# Patient Record
Sex: FEMALE | Race: WHITE | NOT HISPANIC OR LATINO | URBAN - METROPOLITAN AREA
[De-identification: names, ages, dates, MRNs, and addresses within clinical notes are randomized per-mention and may not be internally consistent; named-entity substitution may affect disease eponyms.]

---

## 2017-11-01 ENCOUNTER — OUTPATIENT (OUTPATIENT)
Dept: OUTPATIENT SERVICES | Facility: HOSPITAL | Age: 42
LOS: 1 days | End: 2017-11-01
Payer: MEDICARE

## 2017-11-01 DIAGNOSIS — Z01.818 ENCOUNTER FOR OTHER PREPROCEDURAL EXAMINATION: ICD-10-CM

## 2017-11-01 LAB
ALBUMIN SERPL ELPH-MCNC: 3.8 G/DL — SIGNIFICANT CHANGE UP (ref 3.3–5)
ALP SERPL-CCNC: 102 U/L — SIGNIFICANT CHANGE UP (ref 40–120)
ALT FLD-CCNC: 27 U/L — SIGNIFICANT CHANGE UP (ref 10–45)
ANION GAP SERPL CALC-SCNC: 13 MMOL/L — SIGNIFICANT CHANGE UP (ref 5–17)
APTT BLD: 26.4 SEC — LOW (ref 27.5–37.4)
AST SERPL-CCNC: 27 U/L — SIGNIFICANT CHANGE UP (ref 10–40)
BILIRUB SERPL-MCNC: 0.3 MG/DL — SIGNIFICANT CHANGE UP (ref 0.2–1.2)
BLD GP AB SCN SERPL QL: NEGATIVE — SIGNIFICANT CHANGE UP
BLD GP AB SCN SERPL QL: NEGATIVE — SIGNIFICANT CHANGE UP
BUN SERPL-MCNC: 10 MG/DL — SIGNIFICANT CHANGE UP (ref 7–23)
CALCIUM SERPL-MCNC: 9.1 MG/DL — SIGNIFICANT CHANGE UP (ref 8.4–10.5)
CHLORIDE SERPL-SCNC: 100 MMOL/L — SIGNIFICANT CHANGE UP (ref 96–108)
CO2 SERPL-SCNC: 24 MMOL/L — SIGNIFICANT CHANGE UP (ref 22–31)
CREAT SERPL-MCNC: 0.72 MG/DL — SIGNIFICANT CHANGE UP (ref 0.5–1.3)
GLUCOSE SERPL-MCNC: 84 MG/DL — SIGNIFICANT CHANGE UP (ref 70–99)
HCT VFR BLD CALC: 32.9 % — LOW (ref 34.5–45)
HGB BLD-MCNC: 11.2 G/DL — LOW (ref 11.5–15.5)
INR BLD: 0.9 — SIGNIFICANT CHANGE UP (ref 0.88–1.16)
MCHC RBC-ENTMCNC: 33.1 PG — SIGNIFICANT CHANGE UP (ref 27–34)
MCHC RBC-ENTMCNC: 34 G/DL — SIGNIFICANT CHANGE UP (ref 32–36)
MCV RBC AUTO: 97.3 FL — SIGNIFICANT CHANGE UP (ref 80–100)
PLATELET # BLD AUTO: 167 K/UL — SIGNIFICANT CHANGE UP (ref 150–400)
POTASSIUM SERPL-MCNC: 4.1 MMOL/L — SIGNIFICANT CHANGE UP (ref 3.5–5.3)
POTASSIUM SERPL-SCNC: 4.1 MMOL/L — SIGNIFICANT CHANGE UP (ref 3.5–5.3)
PROT SERPL-MCNC: 6.5 G/DL — SIGNIFICANT CHANGE UP (ref 6–8.3)
PROTHROM AB SERPL-ACNC: 10 SEC — SIGNIFICANT CHANGE UP (ref 9.8–12.7)
RBC # BLD: 3.38 M/UL — LOW (ref 3.8–5.2)
RBC # FLD: 13.5 % — SIGNIFICANT CHANGE UP (ref 10.3–16.9)
RH IG SCN BLD-IMP: POSITIVE — SIGNIFICANT CHANGE UP
RH IG SCN BLD-IMP: POSITIVE — SIGNIFICANT CHANGE UP
SODIUM SERPL-SCNC: 137 MMOL/L — SIGNIFICANT CHANGE UP (ref 135–145)
WBC # BLD: 8.9 K/UL — SIGNIFICANT CHANGE UP (ref 3.8–10.5)
WBC # FLD AUTO: 8.9 K/UL — SIGNIFICANT CHANGE UP (ref 3.8–10.5)

## 2017-11-01 PROCEDURE — 86850 RBC ANTIBODY SCREEN: CPT

## 2017-11-01 PROCEDURE — 85730 THROMBOPLASTIN TIME PARTIAL: CPT

## 2017-11-01 PROCEDURE — 80053 COMPREHEN METABOLIC PANEL: CPT

## 2017-11-01 PROCEDURE — 86900 BLOOD TYPING SEROLOGIC ABO: CPT

## 2017-11-01 PROCEDURE — 85610 PROTHROMBIN TIME: CPT

## 2017-11-01 PROCEDURE — 85027 COMPLETE CBC AUTOMATED: CPT

## 2017-11-01 PROCEDURE — 86901 BLOOD TYPING SEROLOGIC RH(D): CPT

## 2017-11-07 ENCOUNTER — INPATIENT (INPATIENT)
Facility: HOSPITAL | Age: 42
LOS: 3 days | Discharge: HOME | End: 2017-11-11
Attending: OBSTETRICS & GYNECOLOGY | Admitting: OBSTETRICS & GYNECOLOGY
Payer: COMMERCIAL

## 2017-11-07 VITALS — HEIGHT: 68 IN | WEIGHT: 147.05 LBS

## 2017-11-07 LAB
GLUCOSE BLDC GLUCOMTR-MCNC: 76 MG/DL — SIGNIFICANT CHANGE UP (ref 70–99)
T PALLIDUM AB TITR SER: NEGATIVE — SIGNIFICANT CHANGE UP

## 2017-11-07 RX ORDER — NALOXONE HYDROCHLORIDE 4 MG/.1ML
0.1 SPRAY NASAL
Qty: 0 | Refills: 0 | Status: DISCONTINUED | OUTPATIENT
Start: 2017-11-07 | End: 2017-11-11

## 2017-11-07 RX ORDER — HEPARIN SODIUM 5000 [USP'U]/ML
5000 INJECTION INTRAVENOUS; SUBCUTANEOUS EVERY 12 HOURS
Qty: 0 | Refills: 0 | Status: DISCONTINUED | OUTPATIENT
Start: 2017-11-07 | End: 2017-11-11

## 2017-11-07 RX ORDER — SODIUM CHLORIDE 9 MG/ML
1000 INJECTION, SOLUTION INTRAVENOUS
Qty: 0 | Refills: 0 | Status: DISCONTINUED | OUTPATIENT
Start: 2017-11-07 | End: 2017-11-11

## 2017-11-07 RX ORDER — ACETAMINOPHEN 500 MG
650 TABLET ORAL EVERY 6 HOURS
Qty: 0 | Refills: 0 | Status: DISCONTINUED | OUTPATIENT
Start: 2017-11-07 | End: 2017-11-11

## 2017-11-07 RX ORDER — OXYTOCIN 10 UNIT/ML
41.67 VIAL (ML) INJECTION
Qty: 20 | Refills: 0 | Status: DISCONTINUED | OUTPATIENT
Start: 2017-11-07 | End: 2017-11-11

## 2017-11-07 RX ORDER — SODIUM CHLORIDE 9 MG/ML
1000 INJECTION, SOLUTION INTRAVENOUS
Qty: 0 | Refills: 0 | Status: DISCONTINUED | OUTPATIENT
Start: 2017-11-07 | End: 2017-11-07

## 2017-11-07 RX ORDER — TETANUS TOXOID, REDUCED DIPHTHERIA TOXOID AND ACELLULAR PERTUSSIS VACCINE, ADSORBED 5; 2.5; 8; 8; 2.5 [IU]/.5ML; [IU]/.5ML; UG/.5ML; UG/.5ML; UG/.5ML
0.5 SUSPENSION INTRAMUSCULAR ONCE
Qty: 0 | Refills: 0 | Status: COMPLETED | OUTPATIENT
Start: 2017-11-07 | End: 2018-10-06

## 2017-11-07 RX ORDER — SIMETHICONE 80 MG/1
80 TABLET, CHEWABLE ORAL EVERY 4 HOURS
Qty: 0 | Refills: 0 | Status: DISCONTINUED | OUTPATIENT
Start: 2017-11-07 | End: 2017-11-11

## 2017-11-07 RX ORDER — DOCUSATE SODIUM 100 MG
100 CAPSULE ORAL
Qty: 0 | Refills: 0 | Status: DISCONTINUED | OUTPATIENT
Start: 2017-11-07 | End: 2017-11-11

## 2017-11-07 RX ORDER — OXYCODONE AND ACETAMINOPHEN 5; 325 MG/1; MG/1
1 TABLET ORAL
Qty: 0 | Refills: 0 | Status: DISCONTINUED | OUTPATIENT
Start: 2017-11-07 | End: 2017-11-11

## 2017-11-07 RX ORDER — FERROUS SULFATE 325(65) MG
325 TABLET ORAL DAILY
Qty: 0 | Refills: 0 | Status: DISCONTINUED | OUTPATIENT
Start: 2017-11-07 | End: 2017-11-11

## 2017-11-07 RX ORDER — CITRIC ACID/SODIUM CITRATE 300-500 MG
30 SOLUTION, ORAL ORAL ONCE
Qty: 0 | Refills: 0 | Status: COMPLETED | OUTPATIENT
Start: 2017-11-07 | End: 2017-11-07

## 2017-11-07 RX ORDER — METOCLOPRAMIDE HCL 10 MG
10 TABLET ORAL ONCE
Qty: 0 | Refills: 0 | Status: DISCONTINUED | OUTPATIENT
Start: 2017-11-07 | End: 2017-11-07

## 2017-11-07 RX ORDER — SODIUM CHLORIDE 9 MG/ML
1000 INJECTION, SOLUTION INTRAVENOUS ONCE
Qty: 0 | Refills: 0 | Status: COMPLETED | OUTPATIENT
Start: 2017-11-07 | End: 2017-11-07

## 2017-11-07 RX ORDER — OXYTOCIN 10 UNIT/ML
333.33 VIAL (ML) INJECTION
Qty: 20 | Refills: 0 | Status: DISCONTINUED | OUTPATIENT
Start: 2017-11-07 | End: 2017-11-11

## 2017-11-07 RX ORDER — LANOLIN
1 OINTMENT (GRAM) TOPICAL
Qty: 0 | Refills: 0 | Status: DISCONTINUED | OUTPATIENT
Start: 2017-11-07 | End: 2017-11-11

## 2017-11-07 RX ORDER — DIPHENHYDRAMINE HCL 50 MG
25 CAPSULE ORAL EVERY 6 HOURS
Qty: 0 | Refills: 0 | Status: DISCONTINUED | OUTPATIENT
Start: 2017-11-07 | End: 2017-11-11

## 2017-11-07 RX ORDER — OXYTOCIN 10 UNIT/ML
41.67 VIAL (ML) INJECTION
Qty: 20 | Refills: 0 | Status: DISCONTINUED | OUTPATIENT
Start: 2017-11-07 | End: 2017-11-07

## 2017-11-07 RX ORDER — GLYCERIN ADULT
1 SUPPOSITORY, RECTAL RECTAL AT BEDTIME
Qty: 0 | Refills: 0 | Status: DISCONTINUED | OUTPATIENT
Start: 2017-11-07 | End: 2017-11-11

## 2017-11-07 RX ORDER — OXYCODONE AND ACETAMINOPHEN 5; 325 MG/1; MG/1
2 TABLET ORAL EVERY 6 HOURS
Qty: 0 | Refills: 0 | Status: DISCONTINUED | OUTPATIENT
Start: 2017-11-07 | End: 2017-11-11

## 2017-11-07 RX ORDER — IBUPROFEN 200 MG
600 TABLET ORAL EVERY 6 HOURS
Qty: 0 | Refills: 0 | Status: DISCONTINUED | OUTPATIENT
Start: 2017-11-07 | End: 2017-11-11

## 2017-11-07 RX ORDER — ONDANSETRON 8 MG/1
4 TABLET, FILM COATED ORAL EVERY 6 HOURS
Qty: 0 | Refills: 0 | Status: DISCONTINUED | OUTPATIENT
Start: 2017-11-07 | End: 2017-11-11

## 2017-11-07 RX ORDER — CEFAZOLIN SODIUM 1 G
2000 VIAL (EA) INJECTION ONCE
Qty: 0 | Refills: 0 | Status: COMPLETED | OUTPATIENT
Start: 2017-11-07 | End: 2017-11-07

## 2017-11-07 RX ADMIN — SODIUM CHLORIDE 125 MILLILITER(S): 9 INJECTION, SOLUTION INTRAVENOUS at 09:52

## 2017-11-07 RX ADMIN — Medication 325 MILLIGRAM(S): at 18:23

## 2017-11-07 RX ADMIN — HEPARIN SODIUM 5000 UNIT(S): 5000 INJECTION INTRAVENOUS; SUBCUTANEOUS at 23:26

## 2017-11-07 RX ADMIN — Medication 600 MILLIGRAM(S): at 19:19

## 2017-11-07 RX ADMIN — Medication 30 MILLILITER(S): at 10:03

## 2017-11-07 RX ADMIN — Medication 600 MILLIGRAM(S): at 18:23

## 2017-11-07 RX ADMIN — Medication 1 TABLET(S): at 18:22

## 2017-11-07 RX ADMIN — Medication 100 MILLIGRAM(S): at 10:03

## 2017-11-07 RX ADMIN — SODIUM CHLORIDE 2000 MILLILITER(S): 9 INJECTION, SOLUTION INTRAVENOUS at 08:19

## 2017-11-08 LAB
HCT VFR BLD CALC: 25.3 % — LOW (ref 34.5–45)
HGB BLD-MCNC: 8.6 G/DL — LOW (ref 11.5–15.5)
MCHC RBC-ENTMCNC: 32.6 PG — SIGNIFICANT CHANGE UP (ref 27–34)
MCHC RBC-ENTMCNC: 34 G/DL — SIGNIFICANT CHANGE UP (ref 32–36)
MCV RBC AUTO: 95.8 FL — SIGNIFICANT CHANGE UP (ref 80–100)
PLATELET # BLD AUTO: 116 K/UL — LOW (ref 150–400)
RBC # BLD: 2.64 M/UL — LOW (ref 3.8–5.2)
RBC # FLD: 13.6 % — SIGNIFICANT CHANGE UP (ref 10.3–16.9)
WBC # BLD: 12.4 K/UL — HIGH (ref 3.8–10.5)
WBC # FLD AUTO: 12.4 K/UL — HIGH (ref 3.8–10.5)

## 2017-11-08 RX ADMIN — Medication 325 MILLIGRAM(S): at 11:28

## 2017-11-08 RX ADMIN — HEPARIN SODIUM 5000 UNIT(S): 5000 INJECTION INTRAVENOUS; SUBCUTANEOUS at 23:16

## 2017-11-08 RX ADMIN — SODIUM CHLORIDE 125 MILLILITER(S): 9 INJECTION, SOLUTION INTRAVENOUS at 00:27

## 2017-11-08 RX ADMIN — Medication 600 MILLIGRAM(S): at 01:15

## 2017-11-08 RX ADMIN — Medication 600 MILLIGRAM(S): at 13:45

## 2017-11-08 RX ADMIN — OXYCODONE AND ACETAMINOPHEN 1 TABLET(S): 5; 325 TABLET ORAL at 18:43

## 2017-11-08 RX ADMIN — OXYCODONE AND ACETAMINOPHEN 1 TABLET(S): 5; 325 TABLET ORAL at 19:36

## 2017-11-08 RX ADMIN — Medication 100 MILLIGRAM(S): at 11:28

## 2017-11-08 RX ADMIN — Medication 600 MILLIGRAM(S): at 20:20

## 2017-11-08 RX ADMIN — Medication 1 TABLET(S): at 11:28

## 2017-11-08 RX ADMIN — SIMETHICONE 80 MILLIGRAM(S): 80 TABLET, CHEWABLE ORAL at 11:28

## 2017-11-08 RX ADMIN — Medication 600 MILLIGRAM(S): at 12:58

## 2017-11-08 RX ADMIN — Medication 600 MILLIGRAM(S): at 00:28

## 2017-11-08 RX ADMIN — Medication 600 MILLIGRAM(S): at 19:51

## 2017-11-08 RX ADMIN — Medication 600 MILLIGRAM(S): at 07:15

## 2017-11-08 RX ADMIN — Medication 100 MILLIGRAM(S): at 23:16

## 2017-11-08 RX ADMIN — Medication 600 MILLIGRAM(S): at 06:21

## 2017-11-08 RX ADMIN — HEPARIN SODIUM 5000 UNIT(S): 5000 INJECTION INTRAVENOUS; SUBCUTANEOUS at 11:28

## 2017-11-08 NOTE — PROGRESS NOTE ADULT - ASSESSMENT
A/P  42y yo s/p c/s, POD #1, stable and meeting postoperative milestones appropriately.   1. Pain: controlled with OPM.   2. GI: + flatus, regular diet as tolerated   3. : TOV this am   4. DVT prophylaxis: ambulation as tolerated, SubQ heparin   5. Dispo: Likely POD3 or 4

## 2017-11-08 NOTE — PROGRESS NOTE ADULT - SUBJECTIVE AND OBJECTIVE BOX
Patient evaluated at bedside.   She reports pain is well controlled.  She denies headache, dizziness, chest pain, palpitations, shortness of breathe, nausea, vomiting or heavy vaginal bleeding.  She has passed gas and is tolerating clears. Patient is breastfeeding. Overall feels well.     Physical Exam:  Vital Signs Last 24 Hrs  T(C): 36.1 (08 Nov 2017 02:00), Max: 36.4 (07 Nov 2017 13:00)  T(F): 97 (08 Nov 2017 02:00), Max: 97.5 (07 Nov 2017 13:00)  HR: 61 (08 Nov 2017 02:00) (54 - 84)  BP: 99/67 (08 Nov 2017 02:00) (98/68 - 117/67)  BP(mean): --  RR: 18 (08 Nov 2017 02:00) (18 - 18)  SpO2: 97% (08 Nov 2017 02:00) (96% - 97%)    11-07 @ 07:01  -  11-08 @ 06:46  --------------------------------------------------------  IN: 4050 mL / OUT: 1825 mL / NET: 2225 mL        GA: NAD, resting comfortably   Abd: + BS, soft, nontender, nondistended, no rebound or guarding, uterus firm at midline,  fb below umbilicus  Incision: well approximated, no erythema or discharge  : lochia WNL  Extremities: no swelling or calf tenderness

## 2017-11-09 RX ADMIN — Medication 600 MILLIGRAM(S): at 02:09

## 2017-11-09 RX ADMIN — OXYCODONE AND ACETAMINOPHEN 1 TABLET(S): 5; 325 TABLET ORAL at 22:28

## 2017-11-09 RX ADMIN — HEPARIN SODIUM 5000 UNIT(S): 5000 INJECTION INTRAVENOUS; SUBCUTANEOUS at 11:47

## 2017-11-09 RX ADMIN — OXYCODONE AND ACETAMINOPHEN 1 TABLET(S): 5; 325 TABLET ORAL at 21:29

## 2017-11-09 RX ADMIN — Medication 600 MILLIGRAM(S): at 07:41

## 2017-11-09 RX ADMIN — HEPARIN SODIUM 5000 UNIT(S): 5000 INJECTION INTRAVENOUS; SUBCUTANEOUS at 23:34

## 2017-11-09 RX ADMIN — Medication 600 MILLIGRAM(S): at 19:52

## 2017-11-09 RX ADMIN — Medication 600 MILLIGRAM(S): at 11:51

## 2017-11-09 RX ADMIN — Medication 600 MILLIGRAM(S): at 20:30

## 2017-11-09 RX ADMIN — OXYCODONE AND ACETAMINOPHEN 1 TABLET(S): 5; 325 TABLET ORAL at 17:32

## 2017-11-09 RX ADMIN — Medication 600 MILLIGRAM(S): at 06:41

## 2017-11-09 RX ADMIN — Medication 100 MILLIGRAM(S): at 12:37

## 2017-11-09 RX ADMIN — Medication 1 TABLET(S): at 12:37

## 2017-11-09 RX ADMIN — Medication 600 MILLIGRAM(S): at 01:17

## 2017-11-09 RX ADMIN — Medication 325 MILLIGRAM(S): at 12:37

## 2017-11-09 RX ADMIN — Medication 600 MILLIGRAM(S): at 12:30

## 2017-11-09 RX ADMIN — OXYCODONE AND ACETAMINOPHEN 1 TABLET(S): 5; 325 TABLET ORAL at 18:08

## 2017-11-09 NOTE — PROGRESS NOTE ADULT - SUBJECTIVE AND OBJECTIVE BOX
Patient evaluated at bedside.   She reports pain is well controlled.  She denies headache, dizziness, chest pain, palpitations, shortness of breathe, nausea, vomiting or heavy vaginal bleeding.  She has been ambulating without assistance, voiding spontaneously, passing gas, tolerating regular diet and is breastfeeding.    Physical Exam:  Vital Signs Last 24 Hrs  T(C): 36.9 (09 Nov 2017 06:21), Max: 36.9 (08 Nov 2017 18:20)  T(F): 98.5 (09 Nov 2017 06:21), Max: 98.5 (08 Nov 2017 18:20)  HR: 65 (09 Nov 2017 06:21) (65 - 71)  BP: 103/61 (09 Nov 2017 06:21) (92/58 - 103/61)  BP(mean): --  RR: 17 (09 Nov 2017 06:21) (17 - 18)  SpO2: 97% (09 Nov 2017 06:21) (97% - 99%)    11-08 @ 07:01  -  11-09 @ 07:00  --------------------------------------------------------  IN: 0 mL / OUT: 1550 mL / NET: -1550 mL        GA: NAD, resting comfortably   Abd: + BS, soft, nontender, nondistended, no rebound or guarding, uterus firm at midline,  fb below umbilicus  Incision: well approximated, no erythema or discharge  : lochia WNL  Extremities: no swelling or calf tenderness                            8.6    12.4  )-----------( 116      ( 08 Nov 2017 07:08 )             25.3

## 2017-11-09 NOTE — PROGRESS NOTE ADULT - ASSESSMENT
A/P  41yo s/p c/s, POD #2, stable and meeting postoperative milestones appropriately.   1. Pain: controlled with OPM.   2. GI: + flatus, regular diet as tolerated   3. : voiding   4. DVT prophylaxis: ambulation, SUbQ heparin   5. Dispo: Likely POD3 or 4

## 2017-11-10 RX ORDER — TETANUS TOXOID, REDUCED DIPHTHERIA TOXOID AND ACELLULAR PERTUSSIS VACCINE, ADSORBED 5; 2.5; 8; 8; 2.5 [IU]/.5ML; [IU]/.5ML; UG/.5ML; UG/.5ML; UG/.5ML
0.5 SUSPENSION INTRAMUSCULAR ONCE
Qty: 0 | Refills: 0 | Status: COMPLETED | OUTPATIENT
Start: 2017-11-10 | End: 2017-11-10

## 2017-11-10 RX ADMIN — Medication 600 MILLIGRAM(S): at 07:16

## 2017-11-10 RX ADMIN — Medication 325 MILLIGRAM(S): at 12:54

## 2017-11-10 RX ADMIN — Medication 600 MILLIGRAM(S): at 06:36

## 2017-11-10 RX ADMIN — Medication 600 MILLIGRAM(S): at 23:40

## 2017-11-10 RX ADMIN — Medication 600 MILLIGRAM(S): at 18:44

## 2017-11-10 RX ADMIN — Medication 600 MILLIGRAM(S): at 19:00

## 2017-11-10 RX ADMIN — TETANUS TOXOID, REDUCED DIPHTHERIA TOXOID AND ACELLULAR PERTUSSIS VACCINE, ADSORBED 0.5 MILLILITER(S): 5; 2.5; 8; 8; 2.5 SUSPENSION INTRAMUSCULAR at 19:16

## 2017-11-10 RX ADMIN — Medication 600 MILLIGRAM(S): at 00:34

## 2017-11-10 RX ADMIN — Medication 100 MILLIGRAM(S): at 12:53

## 2017-11-10 RX ADMIN — HEPARIN SODIUM 5000 UNIT(S): 5000 INJECTION INTRAVENOUS; SUBCUTANEOUS at 23:39

## 2017-11-10 RX ADMIN — Medication 600 MILLIGRAM(S): at 12:54

## 2017-11-10 RX ADMIN — Medication 600 MILLIGRAM(S): at 13:28

## 2017-11-10 RX ADMIN — Medication 600 MILLIGRAM(S): at 01:34

## 2017-11-10 RX ADMIN — Medication 1 TABLET(S): at 12:54

## 2017-11-10 NOTE — LACTATION INITIAL EVALUATION - OTHER DISEASES WHICH COULD AFFECT LACTATION
Hx of PP Depression with first child, per Social Work note, but pt sought out a counselor and managed it that way. She effectively  for 19 months, and desires to do the same this time, with studies showing breastfeeding balances the body's hormones and lessens the risks/severity of PP Depression. Bf'ing encouraged as long as she desires for this Dyad.

## 2017-11-10 NOTE — PROGRESS NOTE ADULT - SUBJECTIVE AND OBJECTIVE BOX
Patient evaluated at bedside.   She reports pain is well controlled.  She denies headache, dizziness, chest pain, palpitations, shortness of breathe, nausea, vomiting or heavy vaginal bleeding.  She has been ambulating without assistance, voiding spontaneously, passing gas, tolerating regular diet and is breastfeeding. Overall feels well.     Physical Exam:  Vital Signs Last 24 Hrs  T(C): 36.4 (10 Nov 2017 02:00), Max: 36.8 (09 Nov 2017 18:17)  T(F): 97.6 (10 Nov 2017 02:00), Max: 98.2 (09 Nov 2017 18:17)  HR: 68 (10 Nov 2017 02:00) (68 - 88)  BP: 110/68 (10 Nov 2017 02:00) (109/73 - 110/68)  BP(mean): --  RR: 20 (10 Nov 2017 02:00) (17 - 20)  SpO2: 98% (10 Nov 2017 02:00) (98% - 98%)      GA: NAD, resting comfortably   Abd: + BS, soft, nontender, nondistended, no rebound or guarding, uterus firm at midline,  fb below umbilicus  Incision: well approximated, no erythema or discharge  : lochia WNL  Extremities: no swelling or calf tenderness

## 2017-11-10 NOTE — DIETITIAN INITIAL EVALUATION ADULT. - OTHER INFO
41yo s/p c/s, POD #3, tolerates regular diet well > 90% po intake at meals, passing flatus, w/ no BM yet; advised on increased fiber/fluid intake (on bowel regimen), denies N/V; w/ nkfa; BF ing infant w/ no issues; breastfeeding nutrition therapy provided; skin w/ surgical incision w/ no edema.

## 2017-11-10 NOTE — LACTATION INITIAL EVALUATION - NS LACT CON REASON FOR REQ
multiparous mom/This is Mom's second baby. She reports bf'ing her first child, now x6 years old, successfully for 19 months. Denies milk supply issues. She followed baby led weaning with her first child and hopes to do the same thing with this baby; to nurse as long as baby desires and self weans themselves. Pt's desire supported. This baby found latched onto the L breast in laid back position with deep rhythmic sucks and appropriate pauses. Many audible swallows heard. Mom reports a strong pull of her nipple when baby is latched but states "my L nipple has one spot on it that is really sore". She unlatched baby to show me. Nipple came out of baby's mouth round in shape, but there is a skin colored hard spot on the lower left portion of the face of the nipple. It is not white like a bleb normally is and it is not painful to the pt to touch it, which is common with a bleb/plugged duct. It appears this spot is more like a callus or friction rub from a shallow latch. She has longer nipples and that part of the nipple could be rubbing some on baby's hard palate when latched? Reviewed good deep latching and showed ways to facilitate a deeper latch. Reviewed good skin hygiene and maintenance b/w feeds. Face of nipple is not broken or cracked; nipples are pink and "sore but it is normal nipple soreness except for that one spot on the L nipple" as pt stated. Encouraged her to seek outside LC support if deeper latching does not make that spot feel better or heal; verbalizes understanding. Baby born  @ 1039, R C/S, 39.6 wks,  now. x7 voids/x10 poops since birth (breast only), WNL. Wt loss=7.58%, up from 8.15% yesterday and was 10%. Baby has gained weight well and mature milk is coming in and breasts are filling. Encouraged as much S2S as possible and aiming to FOD of baby making sure baby feeds 8-12 times per 24 hours. Questions answered, parents reassured./c/o sore, painful nipples

## 2017-11-10 NOTE — PROGRESS NOTE ADULT - ASSESSMENT
A/P  41yo s/p c/s, POD #3, stable and meeting postoperative milestones appropriately.   1. Pain: controlled with OPM.   2. GI: + flatus, regular diet as tolerated   3. : voiding   4. DVT prophylaxis: ambulation, SUbQ heparin   5. Dispo: Likely POD3 or 4

## 2017-11-10 NOTE — LACTATION INITIAL EVALUATION - INTERVENTION OUTCOME
verbalizes understanding/demonstrates understanding of teaching/discharge criteria met/needs met/good return demonstration

## 2017-11-11 VITALS
OXYGEN SATURATION: 99 % | SYSTOLIC BLOOD PRESSURE: 112 MMHG | HEART RATE: 64 BPM | TEMPERATURE: 98 F | DIASTOLIC BLOOD PRESSURE: 70 MMHG | RESPIRATION RATE: 18 BRPM

## 2017-11-11 PROCEDURE — 88305 TISSUE EXAM BY PATHOLOGIST: CPT

## 2017-11-11 PROCEDURE — 36415 COLL VENOUS BLD VENIPUNCTURE: CPT

## 2017-11-11 PROCEDURE — 90715 TDAP VACCINE 7 YRS/> IM: CPT

## 2017-11-11 PROCEDURE — 86780 TREPONEMA PALLIDUM: CPT

## 2017-11-11 PROCEDURE — 82962 GLUCOSE BLOOD TEST: CPT

## 2017-11-11 PROCEDURE — 85027 COMPLETE CBC AUTOMATED: CPT

## 2017-11-11 RX ADMIN — Medication 100 MILLIGRAM(S): at 12:12

## 2017-11-11 RX ADMIN — Medication 600 MILLIGRAM(S): at 00:00

## 2017-11-11 RX ADMIN — Medication 600 MILLIGRAM(S): at 14:15

## 2017-11-11 RX ADMIN — Medication 650 MILLIGRAM(S): at 13:00

## 2017-11-11 RX ADMIN — Medication 325 MILLIGRAM(S): at 12:12

## 2017-11-11 RX ADMIN — Medication 600 MILLIGRAM(S): at 13:42

## 2017-11-11 RX ADMIN — Medication 650 MILLIGRAM(S): at 12:16

## 2017-11-11 RX ADMIN — Medication 600 MILLIGRAM(S): at 07:17

## 2017-11-11 RX ADMIN — Medication 600 MILLIGRAM(S): at 09:15

## 2017-11-11 RX ADMIN — Medication 1 TABLET(S): at 12:12

## 2017-11-11 NOTE — DISCHARGE NOTE OB - PATIENT PORTAL LINK FT
“You can access the [facility] Patient Portal, offered by [facility], by registering with the following website: [email or web address]”

## 2017-11-11 NOTE — DISCHARGE NOTE OB - PLAN OF CARE
return to prepregnancy state no heavy lifting, light activity, healthy diet, follow up with Dr. Madera in the next 1-2weeks

## 2017-11-11 NOTE — DISCHARGE NOTE OB - CARE PLAN
Principal Discharge DX:	Postpartum state  Goal:	return to prepregnancy state  Instructions for follow-up, activity and diet:	no heavy lifting, light activity, healthy diet, follow up with Dr. Madera in the next 1-2weeks

## 2017-11-11 NOTE — PROGRESS NOTE ADULT - ASSESSMENT
A/P   42y  s/p c/s, POD #4, stable  -  Pain: PO motrin, percocet  -  GI: regular diet  -  : voiding spontaneously   -  DVT prophylaxis: ambulation, SCDs, SQH  -  Dispo: today

## 2017-11-11 NOTE — DISCHARGE NOTE OB - CARE PROVIDER_API CALL
Michael Madera), Obstetrics and Gynecology  [address] [place] [address]  Phone: [phone]  Fax: [phone]

## 2017-11-11 NOTE — PROGRESS NOTE ADULT - SUBJECTIVE AND OBJECTIVE BOX
Patient evaluated at bedside.   She reports pain is well controlled.  She denies nausea, vomiting or heavy vaginal bleeding.  She has been ambulating without assistance, voiding spontaneously, passing gas, tolerating regular diet and is breastfeeding.    Physical Exam:  Vital Signs Last 24 Hrs  T(C): 36.6 (11 Nov 2017 02:00), Max: 36.9 (10 Nov 2017 14:29)  T(F): 97.9 (11 Nov 2017 02:00), Max: 98.4 (10 Nov 2017 14:29)  HR: 62 (11 Nov 2017 02:00) (62 - 79)  BP: 114/72 (11 Nov 2017 02:00) (110/67 - 114/72)  BP(mean): --  RR: 17 (11 Nov 2017 02:00) (17 - 18)  SpO2: 98% (11 Nov 2017 02:00) (97% - 98%)    GA: NAD, A+0 x 3  Abd: + BS, soft, nontender, nondistended, no rebound or guarding, incision clean, dry and intact, pressure dressing removed, steristrips in place, uterus firm at midline,  fb below umbilicus  : lochia WNL  Extremities: no swelling or calf tenderness

## 2017-11-13 LAB — SURGICAL PATHOLOGY STUDY: SIGNIFICANT CHANGE UP

## 2017-11-17 DIAGNOSIS — Z3A.39 39 WEEKS GESTATION OF PREGNANCY: ICD-10-CM

## 2017-11-17 DIAGNOSIS — O34.219 MATERNAL CARE FOR UNSPECIFIED TYPE SCAR FROM PREVIOUS CESAREAN DELIVERY: ICD-10-CM

## 2022-05-31 NOTE — LACTATION INITIAL EVALUATION - BREAST CANCER
Last OV:2-28-22  Last Refill:12-13-21    Next Appt:    With [address] Ann Wheeler MD)  06/06/2022 at 1:20 PM no

## 2023-05-06 NOTE — DISCHARGE NOTE OB - NS OB DC IMMUNIZATIONS2 YN
Goal Outcome Evaluation:      Plan of Care Reviewed With: patient    Overall Patient Progress: no changeOverall Patient Progress: no change    Outcome Evaluation: Pt denies pain this shift. Remains continent of bowel and bladder. Remains assist of 1 with walker in the room. No new skin issues noted. Using call light appropriately.       Yes

## 2024-04-03 NOTE — LACTATION INITIAL EVALUATION - PRO FEEDING PREV EXP YN OB
Dr. Guzman into provide patient with discharge instructions, pt and family verbalized understanding. IV d/marlena with catheter intact.  Pt assisted to car by wheelchair and patient discharged home with family.    See above note/yes

## 2024-06-10 NOTE — PATIENT PROFILE OB - PRESSURE ULCER(S)
Caller: Lauro Gandhi    Relationship: Self    Best call back number: [phone]     Requested Prescriptions:   Requested Prescriptions     Pending Prescriptions Disp Refills    Fluticasone-Umeclidin-Vilant (Trelegy Ellipta) 100-62.5-25 MCG/ACT inhaler 60 each 5     Sig: Inhale 1 puff Daily.    albuterol sulfate  (90 Base) MCG/ACT inhaler 18 g 1     Sig: Inhale 2 puffs Every 4 (Four) Hours As Needed for Shortness of Air or Wheezing.        Pharmacy where request should be sent: [id number] [place] [facility] [address] [facility] [address] & [facility] [phone] [facility] [phone] FX     Last office visit with prescribing clinician: 5/3/2024   Last telemedicine visit with prescribing clinician: Visit date not found   Next office visit with prescribing clinician: 6/14/2024     Additional details provided by patient: PATIENT ONLY HAS 3 DAYS LEFT OF MEDICATION AND IS OUT OF REFILLS.     Does the patient have less than a 3 day supply:  [] Yes  [] No    Would you like a call back once the refill request has been completed: [] Yes [] No    If the office needs to give you a call back, can they leave a voicemail: [] Yes [] No    Samir Laguna Rep   06/10/24 11:53 EDT         
no

## 2025-02-23 NOTE — DIETITIAN INITIAL EVALUATION ADULT. - ENERGY NEEDS
wt (11/7) 66.7kg, IBW: 63.6kg  %IBW: 85%  BMI: 18.1; PPW utilized for nutritional needs as pt within % of IBW; needs adjusted per BFing   Kcal: 30-32kcal/PPW+500kcal (during 1st 6 mo of BFing):2120-2228kcal; fluid: 35-40ml/PPW: 1890-2160ml Female
